# Patient Record
Sex: MALE | Race: WHITE | URBAN - METROPOLITAN AREA
[De-identification: names, ages, dates, MRNs, and addresses within clinical notes are randomized per-mention and may not be internally consistent; named-entity substitution may affect disease eponyms.]

---

## 2018-12-26 ENCOUNTER — TELEPHONE (OUTPATIENT)
Dept: INTERNAL MEDICINE CLINIC | Age: 53
End: 2018-12-26

## 2021-07-26 ENCOUNTER — OFFICE VISIT (OUTPATIENT)
Dept: INTERNAL MEDICINE CLINIC | Age: 56
End: 2021-07-26
Payer: COMMERCIAL

## 2021-07-26 VITALS
OXYGEN SATURATION: 99 % | WEIGHT: 248.4 LBS | SYSTOLIC BLOOD PRESSURE: 138 MMHG | DIASTOLIC BLOOD PRESSURE: 80 MMHG | HEART RATE: 80 BPM

## 2021-07-26 DIAGNOSIS — Z12.5 PROSTATE CANCER SCREENING: ICD-10-CM

## 2021-07-26 DIAGNOSIS — Z00.00 WELL ADULT EXAM: Primary | ICD-10-CM

## 2021-07-26 DIAGNOSIS — Z86.010 HISTORY OF COLON POLYPS: ICD-10-CM

## 2021-07-26 PROCEDURE — 99386 PREV VISIT NEW AGE 40-64: CPT | Performed by: INTERNAL MEDICINE

## 2021-07-26 ASSESSMENT — ENCOUNTER SYMPTOMS
SHORTNESS OF BREATH: 0
DIARRHEA: 0
CHEST TIGHTNESS: 0
CONSTIPATION: 0

## 2021-07-26 ASSESSMENT — PATIENT HEALTH QUESTIONNAIRE - PHQ9
1. LITTLE INTEREST OR PLEASURE IN DOING THINGS: 0
SUM OF ALL RESPONSES TO PHQ9 QUESTIONS 1 & 2: 0
SUM OF ALL RESPONSES TO PHQ QUESTIONS 1-9: 0
SUM OF ALL RESPONSES TO PHQ QUESTIONS 1-9: 0
2. FEELING DOWN, DEPRESSED OR HOPELESS: 0
SUM OF ALL RESPONSES TO PHQ QUESTIONS 1-9: 0

## 2021-07-26 NOTE — PATIENT INSTRUCTIONS
Please check you blood pressure 2-3 times per week at different times of the day. Please bring the readings and your blood pressure cuff with you to your next appointment. Goal blood pressure is under 135/85, ideal is in the 120's/80's and below. Mix equal parts hydrogen peroxide and warm water and put a few drops in your ears 2-3 days per week to prevent wax build up. Patient Education        DASH Diet: Care Instructions  Your Care Instructions     The DASH diet is an eating plan that can help lower your blood pressure. DASH stands for Dietary Approaches to Stop Hypertension. Hypertension is high blood pressure. The DASH diet focuses on eating foods that are high in calcium, potassium, and magnesium. These nutrients can lower blood pressure. The foods that are highest in these nutrients are fruits, vegetables, low-fat dairy products, nuts, seeds, and legumes. But taking calcium, potassium, and magnesium supplements instead of eating foods that are high in those nutrients does not have the same effect. The DASH diet also includes whole grains, fish, and poultry. The DASH diet is one of several lifestyle changes your doctor may recommend to lower your high blood pressure. Your doctor may also want you to decrease the amount of sodium in your diet. Lowering sodium while following the DASH diet can lower blood pressure even further than just the DASH diet alone. Follow-up care is a key part of your treatment and safety. Be sure to make and go to all appointments, and call your doctor if you are having problems. It's also a good idea to know your test results and keep a list of the medicines you take. How can you care for yourself at home? Following the DASH diet  · Eat 4 to 5 servings of fruit each day. A serving is 1 medium-sized piece of fruit, ½ cup chopped or canned fruit, 1/4 cup dried fruit, or 4 ounces (½ cup) of fruit juice. Choose fruit more often than fruit juice.   · Eat 4 to 5 servings of vegetables each day. A serving is 1 cup of lettuce or raw leafy vegetables, ½ cup of chopped or cooked vegetables, or 4 ounces (½ cup) of vegetable juice. Choose vegetables more often than vegetable juice. · Get 2 to 3 servings of low-fat and fat-free dairy each day. A serving is 8 ounces of milk, 1 cup of yogurt, or 1 ½ ounces of cheese. · Eat 6 to 8 servings of grains each day. A serving is 1 slice of bread, 1 ounce of dry cereal, or ½ cup of cooked rice, pasta, or cooked cereal. Try to choose whole-grain products as much as possible. · Limit lean meat, poultry, and fish to 2 servings each day. A serving is 3 ounces, about the size of a deck of cards. · Eat 4 to 5 servings of nuts, seeds, and legumes (cooked dried beans, lentils, and split peas) each week. A serving is 1/3 cup of nuts, 2 tablespoons of seeds, or ½ cup of cooked beans or peas. · Limit fats and oils to 2 to 3 servings each day. A serving is 1 teaspoon of vegetable oil or 2 tablespoons of salad dressing. · Limit sweets and added sugars to 5 servings or less a week. A serving is 1 tablespoon jelly or jam, ½ cup sorbet, or 1 cup of lemonade. · Eat less than 2,300 milligrams (mg) of sodium a day. If you limit your sodium to 1,500 mg a day, you can lower your blood pressure even more. · Be aware that all of these are the suggested number of servings for people who eat 1,800 to 2,000 calories a day. Your recommended number of servings may be different if you need more or fewer calories. Tips for success  · Start small. Do not try to make dramatic changes to your diet all at once. You might feel that you are missing out on your favorite foods and then be more likely to not follow the plan. Make small changes, and stick with them. Once those changes become habit, add a few more changes. · Try some of the following:  ? Make it a goal to eat a fruit or vegetable at every meal and at snacks.  This will make it easy to get the recommended amount of fruits and vegetables each day. ? Try yogurt topped with fruit and nuts for a snack or healthy dessert. ? Add lettuce, tomato, cucumber, and onion to sandwiches. ? Combine a ready-made pizza crust with low-fat mozzarella cheese and lots of vegetable toppings. Try using tomatoes, squash, spinach, broccoli, carrots, cauliflower, and onions. ? Have a variety of cut-up vegetables with a low-fat dip as an appetizer instead of chips and dip. ? Sprinkle sunflower seeds or chopped almonds over salads. Or try adding chopped walnuts or almonds to cooked vegetables. ? Try some vegetarian meals using beans and peas. Add garbanzo or kidney beans to salads. Make burritos and tacos with mashed [place] beans or black beans. Where can you learn more? Go to https://[employer]. org and sign in to your [employer] account. Enter F321 in the [employer] box to learn more about \"DASH Diet: Care Instructions. \"     If you do not have an account, please click on the \"Sign Up Now\" link. Current as of: August 31, 2020               Content Version: 12.9  © 2451-5618 [employer]. Care instructions adapted under license by [employer]. If you have questions about a medical condition or this instruction, always ask your healthcare professional. [place] [address] any warranty or liability for your use of this information. Patient Education        High Blood Pressure: Care Instructions  Overview     It's normal for blood pressure to go up and down throughout the day. But if it stays up, you have high blood pressure. Another name for high blood pressure is hypertension. Despite what a lot of people think, high blood pressure usually doesn't cause headaches or make you feel dizzy or lightheaded. It usually has no symptoms. But it does increase your risk of stroke, heart attack, and other problems.  You and your doctor will talk about your risks of these problems based on your blood pressure. Your doctor will give you a goal for your blood pressure. Your goal will be based on your health and your age. Lifestyle changes, such as eating healthy and being active, are always important to help lower blood pressure. You might also take medicine to reach your blood pressure goal.  Follow-up care is a key part of your treatment and safety. Be sure to make and go to all appointments, and call your doctor if you are having problems. It's also a good idea to know your test results and keep a list of the medicines you take. How can you care for yourself at home? Medical treatment  · If you stop taking your medicine, your blood pressure will go back up. You may take one or more types of medicine to lower your blood pressure. Be safe with medicines. Take your medicine exactly as prescribed. Call your doctor if you think you are having a problem with your medicine. · Talk to your doctor before you start taking aspirin every day. Aspirin can help certain people lower their risk of a heart attack or stroke. But taking aspirin isn't right for everyone, because it can cause serious bleeding. · See your doctor regularly. You may need to see the doctor more often at first or until your blood pressure comes down. · If you are taking blood pressure medicine, talk to your doctor before you take decongestants or anti-inflammatory medicine, such as ibuprofen. Some of these medicines can raise blood pressure. · Learn how to check your blood pressure at home. Lifestyle changes  · Stay at a healthy weight. This is especially important if you put on weight around the waist. Losing even 10 pounds can help you lower your blood pressure. · If your doctor recommends it, get more exercise. Walking is a good choice. Bit by bit, increase the amount you walk every day. Try for at least 30 minutes on most days of the week. You also may want to swim, bike, or do other activities. · Avoid or limit alcohol.  Talk to your doctor about whether you can drink any alcohol. · Try to limit how much sodium you eat to less than 2,300 milligrams (mg) a day. Your doctor may ask you to try to eat less than 1,500 mg a day. · Eat plenty of fruits (such as bananas and oranges), vegetables, legumes, whole grains, and low-fat dairy products. · Lower the amount of saturated fat in your diet. Saturated fat is found in animal products such as milk, cheese, and meat. Limiting these foods may help you lose weight and also lower your risk for heart disease. · Do not smoke. Smoking increases your risk for heart attack and stroke. If you need help quitting, talk to your doctor about stop-smoking programs and medicines. These can increase your chances of quitting for good. When should you call for help? Call 911  anytime you think you may need emergency care. This may mean having symptoms that suggest that your blood pressure is causing a serious heart or blood vessel problem. Your blood pressure may be over 180/120. For example, call 911 if:    · You have symptoms of a heart attack. These may include:  ? Chest pain or pressure, or a strange feeling in the chest.  ? Sweating. ? Shortness of breath. ? Nausea or vomiting. ? Pain, pressure, or a strange feeling in the back, neck, jaw, or upper belly or in one or both shoulders or arms. ? Lightheadedness or sudden weakness. ? A fast or irregular heartbeat.     · You have symptoms of a stroke. These may include:  ? Sudden numbness, tingling, weakness, or loss of movement in your face, arm, or leg, especially on only one side of your body. ? Sudden vision changes. ? Sudden trouble speaking. ? Sudden confusion or trouble understanding simple statements. ? Sudden problems with walking or balance. ? A sudden, severe headache that is different from past headaches.     · You have severe back or belly pain. Do not wait until your blood pressure comes down on its own. Get help right away.   Call blood.  Your doctor will give you a goal for your blood pressure based on your health and your age. High blood pressure (hypertension) means that the top number stays high, or the bottom number stays high, or both. High blood pressure increases the risk of stroke, heart attack, and other problems. What happens when you have high blood pressure? · Blood flows through your arteries with too much force. Over time, this can damage the heart and the walls of your arteries. But you can't feel it. High blood pressure usually doesn't cause symptoms. · High blood pressure makes your heart work harder. And that can lead to heart failure, which means your heart doesn't pump as much blood as your body needs. · Fat and calcium start to build up in your arteries. This buildup is called hardening of the arteries. It can cause many problems including a heart attack and stroke. · Arteries also carry blood and oxygen to organs like your eyes, kidneys, and brain. If high blood pressure damages those arteries, it can lead to vision loss, kidney disease, stroke, and a higher risk of dementia. How can you prevent high blood pressure? · Stay at a healthy weight. · Try to limit how much sodium you eat to less than 2,300 milligrams (mg) a day. If you limit your sodium to 1,500 mg a day, you can lower your blood pressure even more. ? Buy foods that are labeled \"unsalted,\" \"sodium-free,\" or \"low-sodium. \" Foods labeled \"reduced-sodium\" and \"light sodium\" may still have too much sodium. ? Flavor your food with garlic, lemon juice, onion, vinegar, herbs, and spices instead of salt. Do not use soy sauce, steak sauce, onion salt, garlic salt, mustard, or ketchup on your food. ? Use less salt (or none) when recipes call for it. You can often use half the salt a recipe calls for without losing flavor. · Be physically active. Get at least 30 minutes of exercise on most days of the week. Walking is a good choice.  You also may want to do other activities, such as running, swimming, cycling, or playing tennis or team sports. · Limit alcohol to 2 drinks a day for men and 1 drink a day for women. · Eat plenty of fruits, vegetables, and low-fat dairy products. Eat less saturated and total fats. How is high blood pressure treated? · Your doctor will suggest making lifestyle changes to help your heart. For example, your doctor may ask you to eat healthy foods, quit smoking, lose extra weight, and be more active. · If lifestyle changes don't help enough, your doctor may recommend that you take medicine. · When blood pressure is very high, medicines are needed to lower it. Follow-up care is a key part of your treatment and safety. Be sure to make and go to all appointments, and call your doctor if you are having problems. It's also a good idea to know your test results and keep a list of the medicines you take. Where can you learn more? Go to https://[employer]. org and sign in to your [employer] account. Enter P501 in the [employer] box to learn more about \"Learning About High Blood Pressure. \"     If you do not have an account, please click on the \"Sign Up Now\" link. Current as of: August 31, 2020               Content Version: 12.9  © 3144-7204 [employer]. Care instructions adapted under license by [facility] ([facility]). If you have questions about a medical condition or this instruction, always ask your healthcare professional. [place] [address] any warranty or liability for your use of this information.

## 2021-07-26 NOTE — PROGRESS NOTES
Patient: Alexandria Montemayor is a 49 y.o. male who presents today with the following Chief Complaint(s):  Chief Complaint   Patient presents with    New Patient       HPI     Here today to establish. Is worried about his blood pressure. Has family h/o HTN. BP usually runs 126//90. Does not feel bad with his blood pressure. Does not much alcohol (maybe 3 drinks per week), stopped drinking coffee 1 month ago but does drink 1 bottle of iced tea per day. Has an active job- works as a  for [employer], walks 2.5 miles 2-3 days per week. Does notice that he sweats more with activity than usual.     Ears will get clogged with wax. Colonoscopy in 2019. Repeat due 2022 d/t polyps. No Known Allergies   History reviewed. No pertinent past medical history. Past Surgical History:   Procedure Laterality Date    WISDOM TOOTH EXTRACTION        Social History     Socioeconomic History    Marital status:      Spouse name: Not on file    Number of children: Not on file    Years of education: Not on file    Highest education level: Not on file   Occupational History    Not on file   Tobacco Use    Smoking status: Never Smoker    Smokeless tobacco: Never Used   Vaping Use    Vaping Use: Never used   Substance and Sexual Activity    Alcohol use: Yes     Comment: 3 drinks per week at the most    Drug use: Never    Sexual activity: Not on file   Other Topics Concern    Not on file   Social History Narrative    Not on file     Social Determinants of Health     Financial Resource Strain:     Difficulty of Paying Living Expenses:    Food Insecurity:     Worried About Running Out of Food in the Last Year:     [address] in the Last Year:    Transportation Needs:     Lack of Transportation (Medical):      Lack of Transportation (Non-Medical):    Physical Activity:     Days of Exercise per Week:     Minutes of Exercise per Session:    Stress:     Feeling of Stress :    Social Conjunctiva/sclera: Conjunctivae normal.      Pupils: Pupils are equal, round, and reactive to light. Neck:      Thyroid: No thyroid mass or thyromegaly. Vascular: No carotid bruit. Cardiovascular:      Rate and Rhythm: Normal rate and regular rhythm. Pulses:           Dorsalis pedis pulses are 2+ on the right side and 2+ on the left side. Heart sounds: Normal heart sounds. No murmur heard. Comments: No LE edema  Pulmonary:      Effort: Pulmonary effort is normal.      Breath sounds: Normal breath sounds. Lymphadenopathy:      Cervical: No cervical adenopathy. Neurological:      General: No focal deficit present. Mental Status: He is alert and oriented to person, place, and time. Psychiatric:         Mood and Affect: Mood normal.         Behavior: Behavior normal. Behavior is cooperative. ASSESSMENT/PLAN:    Problem List Items Addressed This Visit     History of colon polyps     Patient reports colon polyps on colonoscopy in 2019. Due for repeat colonoscopy in 2022. Well adult exam - Primary      Check fasting blood work. Continue with activity level. Up-to-date on colonoscopy but is due for repeat in 2022 due to colon polyps. Up-to-date on COVID-19 vaccination, no need for early pneumonia vaccine. Recommend Shingrix. Up-to-date on tetanus per patient-we will need to get information through urgent care/occupational health faciltiy where this was done. Relevant Orders    CBC Auto Differential    Comprehensive Metabolic Panel    Lipid Panel    Vitamin D 25 Hydroxy    TSH with Reflex    PSA screening      Other Visit Diagnoses     Prostate cancer screening        Relevant Orders    PSA screening          Current Outpatient Medications   Medication Sig Dispense Refill    Glucosamine-Chondroit-Vit C-Mn (GLUCOSAMINE 1500 COMPLEX PO) Take by mouth       No current facility-administered medications for this visit.        Return in about 6 months (around 1/26/2022) for blood pressure.

## 2021-07-31 PROBLEM — Z86.010 HISTORY OF COLON POLYPS: Status: ACTIVE | Noted: 2021-07-31

## 2021-07-31 PROBLEM — Z86.0100 HISTORY OF COLON POLYPS: Status: ACTIVE | Noted: 2021-07-31

## 2021-07-31 PROBLEM — Z00.00 WELL ADULT EXAM: Status: ACTIVE | Noted: 2021-07-31

## 2021-07-31 NOTE — ASSESSMENT & PLAN NOTE
Check fasting blood work. Continue with activity level. Up-to-date on colonoscopy but is due for repeat in 2022 due to colon polyps. Up-to-date on COVID-19 vaccination, no need for early pneumonia vaccine. Recommend Shingrix. Up-to-date on tetanus per patient-we will need to get information through urgent care/occupational health faciltiy where this was done.

## 2021-08-30 PROBLEM — Z00.00 WELL ADULT EXAM: Status: RESOLVED | Noted: 2021-07-31 | Resolved: 2021-08-30

## 2021-12-22 DIAGNOSIS — Z12.5 PROSTATE CANCER SCREENING: ICD-10-CM

## 2021-12-22 DIAGNOSIS — Z00.00 WELL ADULT EXAM: ICD-10-CM

## 2021-12-22 LAB
A/G RATIO: 1.8 (ref 1.1–2.2)
ALBUMIN SERPL-MCNC: 4.6 G/DL (ref 3.4–5)
ALP BLD-CCNC: 72 U/L (ref 40–129)
ALT SERPL-CCNC: 33 U/L (ref 10–40)
ANION GAP SERPL CALCULATED.3IONS-SCNC: 11 MMOL/L (ref 3–16)
AST SERPL-CCNC: 29 U/L (ref 15–37)
BASOPHILS ABSOLUTE: 0 K/UL (ref 0–0.2)
BASOPHILS RELATIVE PERCENT: 0.6 %
BILIRUB SERPL-MCNC: 0.8 MG/DL (ref 0–1)
BUN BLDV-MCNC: 9 MG/DL (ref 7–20)
CALCIUM SERPL-MCNC: 9.4 MG/DL (ref 8.3–10.6)
CHLORIDE BLD-SCNC: 107 MMOL/L (ref 99–110)
CHOLESTEROL, TOTAL: 181 MG/DL (ref 0–199)
CO2: 26 MMOL/L (ref 21–32)
CREAT SERPL-MCNC: 0.9 MG/DL (ref 0.9–1.3)
EOSINOPHILS ABSOLUTE: 0.1 K/UL (ref 0–0.6)
EOSINOPHILS RELATIVE PERCENT: 1.2 %
GFR AFRICAN AMERICAN: >60
GFR NON-AFRICAN AMERICAN: >60
GLUCOSE BLD-MCNC: 106 MG/DL (ref 70–99)
HCT VFR BLD CALC: 45.9 % (ref 40.5–52.5)
HDLC SERPL-MCNC: 56 MG/DL (ref 40–60)
HEMOGLOBIN: 15.3 G/DL (ref 13.5–17.5)
LDL CHOLESTEROL CALCULATED: 112 MG/DL
LYMPHOCYTES ABSOLUTE: 1.5 K/UL (ref 1–5.1)
LYMPHOCYTES RELATIVE PERCENT: 29.7 %
MCH RBC QN AUTO: 30.5 PG (ref 26–34)
MCHC RBC AUTO-ENTMCNC: 33.2 G/DL (ref 31–36)
MCV RBC AUTO: 91.7 FL (ref 80–100)
MONOCYTES ABSOLUTE: 0.3 K/UL (ref 0–1.3)
MONOCYTES RELATIVE PERCENT: 6 %
NEUTROPHILS ABSOLUTE: 3.1 K/UL (ref 1.7–7.7)
NEUTROPHILS RELATIVE PERCENT: 62.5 %
PDW BLD-RTO: 13.3 % (ref 12.4–15.4)
PLATELET # BLD: 250 K/UL (ref 135–450)
PMV BLD AUTO: 7.8 FL (ref 5–10.5)
POTASSIUM SERPL-SCNC: 4 MMOL/L (ref 3.5–5.1)
PROSTATE SPECIFIC ANTIGEN: 0.61 NG/ML (ref 0–4)
RBC # BLD: 5.01 M/UL (ref 4.2–5.9)
SODIUM BLD-SCNC: 144 MMOL/L (ref 136–145)
TOTAL PROTEIN: 7.1 G/DL (ref 6.4–8.2)
TRIGL SERPL-MCNC: 64 MG/DL (ref 0–150)
TSH REFLEX: 2.01 UIU/ML (ref 0.27–4.2)
VITAMIN D 25-HYDROXY: 29.4 NG/ML
VLDLC SERPL CALC-MCNC: 13 MG/DL
WBC # BLD: 5 K/UL (ref 4–11)

## 2022-01-07 ENCOUNTER — HOSPITAL ENCOUNTER (OUTPATIENT)
Dept: GENERAL RADIOLOGY | Age: 57
Discharge: HOME | End: 2022-01-07
Payer: COMMERCIAL

## 2022-01-07 ENCOUNTER — HOSPITAL ENCOUNTER (OUTPATIENT)
Age: 57
Discharge: HOME | End: 2022-01-07
Payer: COMMERCIAL

## 2022-01-07 ENCOUNTER — OFFICE VISIT (OUTPATIENT)
Dept: INTERNAL MEDICINE CLINIC | Age: 57
End: 2022-01-07
Payer: COMMERCIAL

## 2022-01-07 VITALS
SYSTOLIC BLOOD PRESSURE: 138 MMHG | BODY MASS INDEX: 32.2 KG/M2 | TEMPERATURE: 97.6 F | DIASTOLIC BLOOD PRESSURE: 80 MMHG | WEIGHT: 243 LBS | HEART RATE: 76 BPM | OXYGEN SATURATION: 98 % | HEIGHT: 73 IN

## 2022-01-07 DIAGNOSIS — R07.89 CHEST PRESSURE: ICD-10-CM

## 2022-01-07 DIAGNOSIS — R73.9 HYPERGLYCEMIA: ICD-10-CM

## 2022-01-07 DIAGNOSIS — R00.2 PALPITATIONS: ICD-10-CM

## 2022-01-07 DIAGNOSIS — R22.2 SOFT TISSUE SWELLING OF CHEST WALL: ICD-10-CM

## 2022-01-07 DIAGNOSIS — I10 PRIMARY HYPERTENSION: ICD-10-CM

## 2022-01-07 DIAGNOSIS — R29.818 SUSPECTED SLEEP APNEA: Primary | ICD-10-CM

## 2022-01-07 LAB — HBA1C MFR BLD: 5.1 %

## 2022-01-07 PROCEDURE — 99214 OFFICE O/P EST MOD 30 MIN: CPT | Performed by: INTERNAL MEDICINE

## 2022-01-07 PROCEDURE — 83036 HEMOGLOBIN GLYCOSYLATED A1C: CPT | Performed by: INTERNAL MEDICINE

## 2022-01-07 PROCEDURE — 71046 X-RAY EXAM CHEST 2 VIEWS: CPT

## 2022-01-07 PROCEDURE — 93000 ELECTROCARDIOGRAM COMPLETE: CPT | Performed by: INTERNAL MEDICINE

## 2022-01-07 RX ORDER — NEBIVOLOL 2.5 MG/1
2.5 TABLET ORAL DAILY
Qty: 90 TABLET | Refills: 1 | Status: SHIPPED | OUTPATIENT
Start: 2022-01-07 | End: 2022-01-10 | Stop reason: SDUPTHER

## 2022-01-07 SDOH — ECONOMIC STABILITY: FOOD INSECURITY: WITHIN THE PAST 12 MONTHS, THE FOOD YOU BOUGHT JUST DIDN'T LAST AND YOU DIDN'T HAVE MONEY TO GET MORE.: NEVER TRUE

## 2022-01-07 SDOH — ECONOMIC STABILITY: FOOD INSECURITY: WITHIN THE PAST 12 MONTHS, YOU WORRIED THAT YOUR FOOD WOULD RUN OUT BEFORE YOU GOT MONEY TO BUY MORE.: NEVER TRUE

## 2022-01-07 ASSESSMENT — SOCIAL DETERMINANTS OF HEALTH (SDOH): HOW HARD IS IT FOR YOU TO PAY FOR THE VERY BASICS LIKE FOOD, HOUSING, MEDICAL CARE, AND HEATING?: NOT HARD AT ALL

## 2022-01-07 ASSESSMENT — PATIENT HEALTH QUESTIONNAIRE - PHQ9
SUM OF ALL RESPONSES TO PHQ9 QUESTIONS 1 & 2: 0
2. FEELING DOWN, DEPRESSED OR HOPELESS: 0
SUM OF ALL RESPONSES TO PHQ QUESTIONS 1-9: 0
1. LITTLE INTEREST OR PLEASURE IN DOING THINGS: 0
SUM OF ALL RESPONSES TO PHQ QUESTIONS 1-9: 0

## 2022-01-07 NOTE — PROGRESS NOTES
Patient: Yovany Tolentino is a 49 y.o. male who presents today with the following Chief Complaint(s):  Chief Complaint   Patient presents with    Follow-up     6 months    Other     chest pressure for the past week         HPI     Here today for follow up. Has been watching his blood pressure at home over the past few weeks- was in the upper 140's/90's. Had an episode last week where he felt like his heart was pounding really hard- woke him up from sleep. Happened 5 times that night. Cut out caffeine (was only drinking 1 cup of coffee per day), iced tea, chocolate. Happened the next night as well. Did take about 3-4 days to subside. Will have some episodes of \"beating hard\" and will need to catch his breath. Has had some HA as well. Does not sleep at night-sleep is restless. Does snore. Does not have chest symptoms with activity. Feels better when he is more active. Did tear out and replace the insulation attic about 2 weeks ago. Has a bump on his chest- gets bigger at times and smaller at times. Feels well but doesn't feel \"right\". Was eating peanut brittle and other treats around time that had labs drawn. POCT HbA1c 5.1%    Results for orders placed or performed in visit on 01/07/22   POCT glycosylated hemoglobin (Hb A1C)   Result Value Ref Range    Hemoglobin A1C 5.1 %      The 10-year ASCVD risk score (Sepideh Hassan, et al., 2013) is: 5.4%    Values used to calculate the score:      Age: 49 years      Sex: Male      Is Non- : No      Diabetic: No      Tobacco smoker: No      Systolic Blood Pressure: 303 mmHg      Is BP treated: Yes      HDL Cholesterol: 56 mg/dL      Total Cholesterol: 181 mg/dL      No Known Allergies   No past medical history on file.    Past Surgical History:   Procedure Laterality Date    WISDOM TOOTH EXTRACTION        Social History     Socioeconomic History    Marital status:      Spouse name: Not on file    Number of children: Not on file    Years of education: Not on file    Highest education level: Not on file   Occupational History    Not on file   Tobacco Use    Smoking status: Never Smoker    Smokeless tobacco: Never Used   Vaping Use    Vaping Use: Never used   Substance and Sexual Activity    Alcohol use: Yes     Comment: 3 drinks per week at the most    Drug use: Never    Sexual activity: Not on file   Other Topics Concern    Not on file   Social History Narrative    Not on file     Social Determinants of Health     Financial Resource Strain: Low Risk     Difficulty of Paying Living Expenses: Not hard at all   Food Insecurity: No Food Insecurity    Worried About [facility] in the Last Year: Never true    [facility] in the Last Year: Never true   Transportation Needs:     Lack of Transportation (Medical): Not on file    Lack of Transportation (Non-Medical):  Not on file   Physical Activity:     Days of Exercise per Week: Not on file    Minutes of Exercise per Session: Not on file   Stress:     Feeling of Stress : Not on file   Social Connections:     Frequency of Communication with Friends and Family: Not on file    Frequency of Social Gatherings with Friends and Family: Not on file    Attends Gnosticist Services: Not on file    Active Member of [address] [place] [address] [employer] or Organizations: Not on file    Attends Club or Organization Meetings: Not on file    Marital Status: Not on file   Intimate Partner Violence:     Fear of Current or Ex-Partner: Not on file    Emotionally Abused: Not on file    Physically Abused: Not on file    Sexually Abused: Not on file   Housing Stability:     Unable to Pay for Housing in the Last Year: Not on file    Number of [place] in the Last Year: Not on file    Unstable Housing in the Last Year: Not on file     Family History   Problem Relation Age of Onset    High Blood Pressure Mother     Cancer Mother 76        breast cancer    Emphysema Father     Asthma Father     High Blood Pressure Sister     High Blood Pressure Sister     High Blood Pressure Brother     No Known Problems Son     No Known Problems Son         Outpatient Medications Prior to Visit   Medication Sig Dispense Refill    Glucosamine-Chondroit-Vit C-Mn (GLUCOSAMINE 1500 COMPLEX PO) Take by mouth       No facility-administered medications prior to visit. Patient'spast medical history, surgical history, family history, medications,  and allergies  were all reviewed and updated as appropriate today. Review of Systems   Constitutional: Negative for appetite change, fatigue and fever. Respiratory: Negative for chest tightness and shortness of breath. Cardiovascular: Positive for palpitations. Negative for chest pain. Gastrointestinal: Negative for constipation and diarrhea. Skin: Negative for rash. /80   Pulse 76   Temp 97.6 °F (36.4 °C)   Ht 6' 1\" (1.854 m)   Wt 243 lb (110.2 kg)   SpO2 98%   BMI 32.06 kg/m²   Physical Exam  Vitals and nursing note reviewed. Constitutional:       Appearance: He is well-developed. He is not toxic-appearing. HENT:      Head: Normocephalic. Right Ear: Tympanic membrane, ear canal and external ear normal.      Left Ear: Tympanic membrane, ear canal and external ear normal.      Mouth/Throat:      Pharynx: No oropharyngeal exudate or posterior oropharyngeal erythema. Eyes:      General: No scleral icterus. Extraocular Movements: Extraocular movements intact. Conjunctiva/sclera: Conjunctivae normal.      Pupils: Pupils are equal, round, and reactive to light. Neck:      Thyroid: No thyroid mass or thyromegaly. Vascular: No carotid bruit. Cardiovascular:      Rate and Rhythm: Normal rate and regular rhythm. Heart sounds: Normal heart sounds. No murmur heard. Pulmonary:      Effort: Pulmonary effort is normal.      Breath sounds: Normal breath sounds.    Chest:      Chest wall: No mass, lacerations, deformity, swelling, tenderness, crepitus or edema. Breasts: Breasts are symmetrical.       Musculoskeletal:      Right lower leg: No edema. Left lower leg: No edema. Lymphadenopathy:      Cervical: No cervical adenopathy. Neurological:      General: No focal deficit present. Mental Status: He is alert and oriented to person, place, and time. Psychiatric:         Mood and Affect: Mood normal.         Behavior: Behavior normal. Behavior is cooperative. ASSESSMENT/PLAN:    Problem List Items Addressed This Visit     Suspected sleep apnea - Primary     Refer to sleep medicine. Relevant Orders    Lashay Eli MD, Sleep Medicine, [facility]    Soft tissue swelling of chest wall      Likely benign. Check chest x-ray. Area in question is the xiphoid process which is normal on exam.         Relevant Orders    XR CHEST STANDARD (2 VW) (Completed)    Primary hypertension      Start Bystolic 2.5 mg nightly. Discussed lifestyle modifications to help with hypertension. Discussed concern for possible underlying sleep apnea which may be contributing to his hypertension. Palpitations     EKG was unremarkable. May need an event monitor. Start Bystolic 2.5 mg nightly for hypertension which should also help with palpitations. Refer for sleep study. Hyperglycemia      PCO T hemoglobin A1c was 5.1%. Discussed diet and weight loss. Relevant Orders    POCT glycosylated hemoglobin (Hb A1C) (Completed)    Chest pressure      Nonexertional.  Possibly related to hypertension. Possibly musculoskeletal in nature given timing following cleaning the attic and replacing insulation. EKG in the office was unremarkable.          Relevant Orders    EKG 12 lead (Completed)          Current Outpatient Medications   Medication Sig Dispense Refill    nebivolol (BYSTOLIC) 2.5 MG tablet Take 1 tablet by mouth daily 90 tablet 1    Glucosamine-Chondroit-Vit C-Mn (GLUCOSAMINE 1500 COMPLEX PO) Take by mouth       No current facility-administered medications for this visit. Return in about 6 weeks (around 2/18/2022) for BP.

## 2022-01-08 PROBLEM — R22.2 SOFT TISSUE SWELLING OF CHEST WALL: Status: ACTIVE | Noted: 2022-01-08

## 2022-01-08 PROBLEM — R73.9 HYPERGLYCEMIA: Status: ACTIVE | Noted: 2022-01-08

## 2022-01-08 ASSESSMENT — ENCOUNTER SYMPTOMS
DIARRHEA: 0
SHORTNESS OF BREATH: 0
CONSTIPATION: 0
CHEST TIGHTNESS: 0

## 2022-01-09 NOTE — ASSESSMENT & PLAN NOTE
EKG was unremarkable. May need an event monitor. Start Bystolic 2.5 mg nightly for hypertension which should also help with palpitations. Refer for sleep study.

## 2022-01-09 NOTE — ASSESSMENT & PLAN NOTE
Start Bystolic 2.5 mg nightly. Discussed lifestyle modifications to help with hypertension. Discussed concern for possible underlying sleep apnea which may be contributing to his hypertension.

## 2022-01-09 NOTE — ASSESSMENT & PLAN NOTE
Likely benign. Check chest x-ray.   Area in question is the xiphoid process which is normal on exam.

## 2022-01-10 ENCOUNTER — TELEPHONE (OUTPATIENT)
Dept: INTERNAL MEDICINE CLINIC | Age: 57
End: 2022-01-10

## 2022-01-10 RX ORDER — NEBIVOLOL 2.5 MG/1
2.5 TABLET ORAL DAILY
Qty: 30 TABLET | Refills: 0 | Status: SHIPPED | OUTPATIENT
Start: 2022-01-10 | End: 2022-02-09

## 2022-01-10 RX ORDER — NEBIVOLOL 2.5 MG/1
2.5 TABLET ORAL DAILY
Qty: 90 TABLET | Refills: 3 | Status: SHIPPED | OUTPATIENT
Start: 2022-01-10 | End: 2022-03-09 | Stop reason: SDUPTHER

## 2022-01-10 NOTE — TELEPHONE ENCOUNTER
Spoke with [employer] and pt's insurance will only cover 30 day supply. They want pt to use [facility]. Pt was notified.

## 2022-01-10 NOTE — TELEPHONE ENCOUNTER
Pt calling about nebivolol (BYSTOLIC) 2.5 MG tablet. States he called pharmacy and they only have a 30 day supply, they did not have it in stock but should be getting it tomorrow. Pt aware 90 days was sent over, unsure why pharmacy is only going to give him 30 day supply. Please advise.

## 2022-02-09 ENCOUNTER — OFFICE VISIT (OUTPATIENT)
Dept: INTERNAL MEDICINE CLINIC | Age: 57
End: 2022-02-09
Payer: COMMERCIAL

## 2022-02-09 VITALS
HEART RATE: 64 BPM | SYSTOLIC BLOOD PRESSURE: 130 MMHG | WEIGHT: 239.2 LBS | OXYGEN SATURATION: 98 % | BODY MASS INDEX: 31.56 KG/M2 | DIASTOLIC BLOOD PRESSURE: 84 MMHG

## 2022-02-09 DIAGNOSIS — Z00.00 WELL ADULT EXAM: ICD-10-CM

## 2022-02-09 DIAGNOSIS — I10 PRIMARY HYPERTENSION: Primary | ICD-10-CM

## 2022-02-09 DIAGNOSIS — R00.2 PALPITATIONS: ICD-10-CM

## 2022-02-09 DIAGNOSIS — R73.9 HYPERGLYCEMIA: ICD-10-CM

## 2022-02-09 DIAGNOSIS — Z12.5 PROSTATE CANCER SCREENING: ICD-10-CM

## 2022-02-09 DIAGNOSIS — R07.89 CHEST PRESSURE: ICD-10-CM

## 2022-02-09 DIAGNOSIS — R29.818 SUSPECTED SLEEP APNEA: ICD-10-CM

## 2022-02-09 PROCEDURE — 99214 OFFICE O/P EST MOD 30 MIN: CPT | Performed by: INTERNAL MEDICINE

## 2022-02-09 RX ORDER — OMEPRAZOLE 40 MG/1
40 CAPSULE, DELAYED RELEASE ORAL
Qty: 90 CAPSULE | Refills: 2 | Status: SHIPPED | OUTPATIENT
Start: 2022-02-09 | End: 2022-08-09 | Stop reason: SDUPTHER

## 2022-02-09 NOTE — PROGRESS NOTES
Patient: Virginia Peralta is a 49 y.o. male who presents today with the following Chief Complaint(s):  Chief Complaint   Patient presents with    Check-Up       HPI     Here today for follow up on blood pressure. Father  last week,  is tomorrow. Cat was recently found to have cancer. Chest pains seem to be improving. Feels like food is stuck in his throat. Worse in the past week since his father . Is scheduled for sleep appointment with Dr. Femi Guerrero in March. CXR was normal in January. Palpitations have improved with Bystolic. Does notice palpitations more when quiet and mind is able to wander. Has also cut out caffeine. Has been trying to eat better as well. BP at home running from 115/70's-130/89. HR has been around 55. Wt Readings from Last 3 Encounters:   22 239 lb 3.2 oz (108.5 kg)   22 243 lb (110.2 kg)   21 248 lb 6.4 oz (112.7 kg)     Temp Readings from Last 3 Encounters:   22 97.6 °F (36.4 °C)     BP Readings from Last 3 Encounters:   22 130/84   22 138/80   21 138/80     Pulse Readings from Last 3 Encounters:   22 64   22 76   21 80         No Known Allergies   No past medical history on file.    Past Surgical History:   Procedure Laterality Date    WISDOM TOOTH EXTRACTION        Social History     Socioeconomic History    Marital status:      Spouse name: Not on file    Number of children: Not on file    Years of education: Not on file    Highest education level: Not on file   Occupational History    Not on file   Tobacco Use    Smoking status: Never Smoker    Smokeless tobacco: Never Used   Vaping Use    Vaping Use: Never used   Substance and Sexual Activity    Alcohol use: Yes     Comment: 3 drinks per week at the most    Drug use: Never    Sexual activity: Not on file   Other Topics Concern    Not on file   Social History Narrative    Not on file     Social Determinants of Health Financial Resource Strain: Low Risk     Difficulty of Paying Living Expenses: Not hard at all   Food Insecurity: No Food Insecurity    Worried About Running Out of Food in the Last Year: Never true    Apolinar of Food in the Last Year: Never true   Transportation Needs:     Lack of Transportation (Medical): Not on file    Lack of Transportation (Non-Medical):  Not on file   Physical Activity:     Days of Exercise per Week: Not on file    Minutes of Exercise per Session: Not on file   Stress:     Feeling of Stress : Not on file   Social Connections:     Frequency of Communication with Friends and Family: Not on file    Frequency of Social Gatherings with Friends and Family: Not on file    Attends [employer] Services: Not on file    Active Member of [address] [place] [address] [employer] or Organizations: Not on file    Attends Club or Organization Meetings: Not on file    Marital Status: Not on file   Intimate Partner Violence:     Fear of Current or Ex-Partner: Not on file    Emotionally Abused: Not on file    Physically Abused: Not on file    Sexually Abused: Not on file   Housing Stability:     Unable to Pay for Housing in the Last Year: Not on file    Number of [place] in the Last Year: Not on file    Unstable Housing in the Last Year: Not on file     Family History   Problem Relation Age of Onset    High Blood Pressure Mother     Cancer Mother 76        breast cancer    Emphysema Father     Asthma Father     High Blood Pressure Sister     High Blood Pressure Sister     High Blood Pressure Brother     No Known Problems Son     No Known Problems Son         Outpatient Medications Prior to Visit   Medication Sig Dispense Refill    nebivolol (BYSTOLIC) 2.5 MG tablet Take 1 tablet by mouth daily 90 tablet 3    Glucosamine-Chondroit-Vit C-Mn (GLUCOSAMINE 1500 COMPLEX PO) Take by mouth      nebivolol (BYSTOLIC) 2.5 MG tablet Take 1 tablet by mouth daily 30 tablet 0     No facility-administered medications prior to visit. Patient'spast medical history, surgical history, family history, medications,  and allergies  were all reviewed and updated as appropriate today. Review of Systems   Constitutional: Negative for appetite change, fatigue and fever. Respiratory: Negative for chest tightness and shortness of breath. Cardiovascular: Negative for chest pain. Gastrointestinal: Negative for constipation and diarrhea. Skin: Negative for rash. /84   Pulse 64   Wt 239 lb 3.2 oz (108.5 kg)   SpO2 98%   BMI 31.56 kg/m²   Physical Exam  Vitals and nursing note reviewed. Constitutional:       Appearance: He is well-developed. He is not toxic-appearing. HENT:      Head: Normocephalic. Right Ear: Tympanic membrane, ear canal and external ear normal.      Left Ear: Tympanic membrane, ear canal and external ear normal.      Mouth/Throat:      Pharynx: No oropharyngeal exudate or posterior oropharyngeal erythema. Eyes:      General: No scleral icterus. Extraocular Movements: Extraocular movements intact. Conjunctiva/sclera: Conjunctivae normal.      Pupils: Pupils are equal, round, and reactive to light. Neck:      Thyroid: No thyroid mass or thyromegaly. Vascular: No carotid bruit. Cardiovascular:      Rate and Rhythm: Normal rate and regular rhythm. Heart sounds: Normal heart sounds. No murmur heard. Pulmonary:      Effort: Pulmonary effort is normal.      Breath sounds: Normal breath sounds. Abdominal:      Palpations: Abdomen is soft. Tenderness: There is no abdominal tenderness. Musculoskeletal:      Right lower leg: No edema. Left lower leg: No edema. Lymphadenopathy:      Cervical: No cervical adenopathy. Neurological:      General: No focal deficit present. Mental Status: He is alert and oriented to person, place, and time. Psychiatric:         Mood and Affect: Mood normal.         Behavior: Behavior normal. Behavior is cooperative. ASSESSMENT/PLAN:    Problem List Items Addressed This Visit     Chest pressure      Sounds to be related to GERD and stress. Chest pains were worse around the time his father was dying. Trial of omeprazole 40 mg daily. Hyperglycemia      Check hemoglobin A1c. Relevant Orders    Hemoglobin A1C    Palpitations     Palpitations have resolved since starting Bystolic. Continue Bystolic. Primary hypertension - Primary     Better. Continue Bystolic 2.5 mg nightly. Relevant Orders    CBC Auto Differential    Comprehensive Metabolic Panel    Lipid Panel    Suspected sleep apnea      Has appointment scheduled to see Dr. Laya Kim in March. Other Visit Diagnoses     Well adult exam        Relevant Orders    CBC Auto Differential    Comprehensive Metabolic Panel    Hemoglobin A1C    Lipid Panel    PSA screening    TSH with Reflex    Prostate cancer screening        Relevant Orders    PSA screening          Current Outpatient Medications   Medication Sig Dispense Refill    omeprazole (PRILOSEC) 40 MG delayed release capsule Take 1 capsule by mouth every morning (before breakfast) 90 capsule 2    nebivolol (BYSTOLIC) 2.5 MG tablet Take 1 tablet by mouth daily 90 tablet 3    Glucosamine-Chondroit-Vit C-Mn (GLUCOSAMINE 1500 COMPLEX PO) Take by mouth       No current facility-administered medications for this visit. Return in about 6 months (around 8/9/2022) for wellness w/labs ptr.

## 2022-02-09 NOTE — PATIENT INSTRUCTIONS
Please have your labs drawn about 1 week prior to your next appointment August . You may get your labs drawn in our office, Monday-Friday from 7:30 am-4:00 pm. You do not need an appointment. If this does not work for you, you may also have your labs drawn at [facility] earlier during the week or on weekends. If you prefer to have your labs draw elsewhere ([facility]), please allow a little more time for me to get your results and please call the office ahead of your appointment so that we may make sure that we have your labs at the time of your appointment. We sometimes need to call the lab directly (when not done at a  [facility] lab) to get your results. Your labs are fasting.      fasting (nothing to eat w/in 8 hours of the lab test, is ok to drink water or black coffee)

## 2022-02-20 ASSESSMENT — ENCOUNTER SYMPTOMS
SHORTNESS OF BREATH: 0
CHEST TIGHTNESS: 0
DIARRHEA: 0
CONSTIPATION: 0

## 2022-03-08 ENCOUNTER — TELEPHONE (OUTPATIENT)
Dept: INTERNAL MEDICINE CLINIC | Age: 57
End: 2022-03-08

## 2022-03-08 NOTE — TELEPHONE ENCOUNTER
Patient calling requesting refill of nebivolol (BYSTOLIC) 2.5 MG tablet. Patient is requesting 90 day supply and has changed pharmacy. Last written 01/10/22  Last OV 02/09/22  Next OV 08/09/22  Last recommended OV 08/09/22     Please send to [facility] [address].

## 2022-03-09 RX ORDER — NEBIVOLOL 2.5 MG/1
2.5 TABLET ORAL DAILY
Qty: 90 TABLET | Refills: 3 | Status: SHIPPED | OUTPATIENT
Start: 2022-03-09 | End: 2022-08-09 | Stop reason: SDUPTHER

## 2022-03-10 ENCOUNTER — TELEMEDICINE (OUTPATIENT)
Dept: PULMONOLOGY | Age: 57
End: 2022-03-10
Payer: COMMERCIAL

## 2022-03-10 DIAGNOSIS — R06.83 SNORING: ICD-10-CM

## 2022-03-10 DIAGNOSIS — I10 PRIMARY HYPERTENSION: Chronic | ICD-10-CM

## 2022-03-10 DIAGNOSIS — G47.10 HYPERSOMNIA: Primary | ICD-10-CM

## 2022-03-10 DIAGNOSIS — E66.9 NON MORBID OBESITY, UNSPECIFIED OBESITY TYPE: Chronic | ICD-10-CM

## 2022-03-10 PROCEDURE — 99204 OFFICE O/P NEW MOD 45 MIN: CPT | Performed by: INTERNAL MEDICINE

## 2022-03-10 ASSESSMENT — ENCOUNTER SYMPTOMS
APNEA: 0
CHOKING: 0
EYE PAIN: 0
VOMITING: 0
RHINORRHEA: 0
SHORTNESS OF BREATH: 0
ABDOMINAL PAIN: 0
ALLERGIC/IMMUNOLOGIC NEGATIVE: 1
ABDOMINAL DISTENTION: 0
NAUSEA: 0
CHEST TIGHTNESS: 0
PHOTOPHOBIA: 0

## 2022-03-10 ASSESSMENT — SLEEP AND FATIGUE QUESTIONNAIRES
HOW LIKELY ARE YOU TO NOD OFF OR FALL ASLEEP WHILE SITTING QUIETLY AFTER LUNCH WITHOUT ALCOHOL: 0
HOW LIKELY ARE YOU TO NOD OFF OR FALL ASLEEP WHILE LYING DOWN TO REST IN THE AFTERNOON WHEN CIRCUMSTANCES PERMIT: 3
HOW LIKELY ARE YOU TO NOD OFF OR FALL ASLEEP IN A CAR, WHILE STOPPED FOR A FEW MINUTES IN TRAFFIC: 0
HOW LIKELY ARE YOU TO NOD OFF OR FALL ASLEEP WHEN YOU ARE A PASSENGER IN A CAR FOR AN HOUR WITHOUT A BREAK: 0
HOW LIKELY ARE YOU TO NOD OFF OR FALL ASLEEP WHILE SITTING AND TALKING TO SOMEONE: 0
HOW LIKELY ARE YOU TO NOD OFF OR FALL ASLEEP WHILE SITTING INACTIVE IN A PUBLIC PLACE: 0
HOW LIKELY ARE YOU TO NOD OFF OR FALL ASLEEP WHILE WATCHING TV: 2
ESS TOTAL SCORE: 5
HOW LIKELY ARE YOU TO NOD OFF OR FALL ASLEEP WHILE SITTING AND READING: 0

## 2022-03-10 NOTE — LETTER
[facility] Sleep Medicine  [place] [address]  Phone: [phone]  Fax: [phone]           Donnie Gomez MD      March 10, 2022     Patient: John Bellamy   MR Number: [id number]   YOB: 1965   Date of Visit: 3/10/2022       Dear Dr. Juan Hill:    Thank you for referring John Bellamy to me for evaluation/treatment. Below are the relevant portions of my assessment and plan of care. Visit Diagnoses and Associated Orders     Hypersomnia   (New Problem)  -  Primary    needs work-up    Home Sleep Study (HST) [74378 Custom]   - Future Order         Snoring   (New Problem)      needs work-up    Home Sleep Study (HST) [98255 Custom]   - Future Order         Primary hypertension   (Stable)           Non morbid obesity, unspecified obesity type   (Not Stable)                 One or more undiagnosed new problem with uncertain prognosis till final diagnosis is made. Differential diagnosis includes but not limited to: MARIBELL, PLMD's, narcolepsy, parasomnias. Reviewed MARIBELL (highest likelihood Dx): pathophysiology, diagnosis, complications and treatment. Instructed him not to drive if drowsy. Continue medications per her PCP and other physicians. Limit caffeine use after 3pm. Standard of care is to do in-lab PSG but insurance is mandating an inferior HST. 1 wk follow up after study to review his results. The chronic medical conditions listed are directly related to the primary diagnosis listed above. The management of the primary diagnosis affects the secondary diagnosis and vice versa. This information was analyzed to assess complexity and medical decision making in regards to further testing and management. Continue meds for: HTN. Pt would medically benefit from wt loss for MARIBELL (diet, exercise, surgical). Orders Placed This Encounter   Procedures    Home Sleep Study (HST)       If you have questions, please do not hesitate to call me.  I look forward to following Paula Hi along with you.     Sincerely,        Mariangel Gates MD    CC providers:  Sheri Hopper DO  [address] [address]  [place] [place]

## 2022-03-10 NOTE — PROGRESS NOTES
Giacomo Guzman MD, [facility], [facility]  Nicolasa Ridley [address]  [address] [facility], [address] [phone]   [facility] [address] Gomez Mckeon. [address] [place] [address]. Rosie Llamas [address] [phone]     Video Visit- Consult    Cash Justo, was evaluated through a synchronous (real-time) audio-video  encounter. The patient (or guardian if applicable) is aware that this is a billable  service, which includes applicable co-pays. This Virtual Visit was conducted with  patient's (and/or legal guardian's) consent. The visit was conducted pursuant to  the emergency declaration under the [facility], [address] [place] [address] waVA Hospital authority and the [employer] and  [employer] General Act. Patient identification was verified,  and a caregiver was present when appropriate. The patient was located in a  state where the provider was licensed to provide care. Assessment:      Visit Diagnoses and Associated Orders     Hypersomnia   (New Problem)  -  Primary    needs work-up    Home Sleep Study (HST) [39751 Custom]   - Future Order         Snoring   (New Problem)      needs work-up    Home Sleep Study (HST) [13806 Custom]   - Future Order         Primary hypertension   (Stable)           Non morbid obesity, unspecified obesity type   (Not Stable)                  Plan:      One or more undiagnosed new problem with uncertain prognosis till final diagnosis is made. Differential diagnosis includes but not limited to: MARIBELL, PLMD's, narcolepsy, parasomnias. Reviewed MARIBELL (highest likelihood Dx): pathophysiology, diagnosis, complications and treatment. Instructed him not to drive if drowsy. Continue medications per her PCP and other physicians. Limit caffeine use after 3pm. Standard of care is to do in-lab PSG but insurance is mandating an inferior HST. 1 wk follow up after study to review his results.   The chronic medical conditions listed are directly related to the primary diagnosis listed above. The management of the primary diagnosis affects the secondary diagnosis and vice versa. This information was analyzed to assess complexity and medical decision making in regards to further testing and management. Continue meds for: HTN. Pt would medically benefit from wt loss for MARIBELL (diet, exercise, surgical). Orders Placed This Encounter   Procedures    Home Sleep Study (HST)          Subjective:     Patient ID: Priscila Navarro is a 49 y.o. male. Chief Complaint   Patient presents with    Sleep Apnea    Daytime Sleepiness       HPI:      Priscila Navarro is a 49 y.o. male referred by Huey Simmons DO for a sleep evaluation. He complains of: snoring, excessive daytime sleepiness , non-restorative sleep, nocturia and tossing and turning at night. He denies: cataplexy and hypnagogic hallucinations. Symptoms began several years ago, gradually worsening since that time    Previous evaluation and treatment has included- None     Chronic stable medical conditions: HTN  Chronic unstable medical conditions: obesity    DOT/CDL - No  FAA/'s license -No    Previous Report(s) Reviewed: historical medical records, office notes, and referral letter(s). Pertinent data has been documented. [facility] - Total score: 5    Caffeine Intake - None.     Social History     Socioeconomic History    Marital status:      Spouse name: Not on file    Number of children: Not on file    Years of education: Not on file    Highest education level: Not on file   Occupational History    Not on file   Tobacco Use    Smoking status: Never Smoker    Smokeless tobacco: Never Used   Vaping Use    Vaping Use: Never used   Substance and Sexual Activity    Alcohol use: Yes     Comment: 3 drinks per week at the most    Drug use: Never    Sexual activity: Not on file   Other Topics Concern    Not on file   Social History Narrative    Not on file     Social Determinants of Health     Financial Resource Strain: Low Risk     Difficulty of Paying Living Expenses: Not hard at all   Food Insecurity: No Food Insecurity    Worried About Running Out of Food in the Last Year: Never true    Apolinar of Food in the Last Year: Never true   Transportation Needs:     Lack of Transportation (Medical): Not on file    Lack of Transportation (Non-Medical):  Not on file   Physical Activity:     Days of Exercise per Week: Not on file    Minutes of Exercise per Session: Not on file   Stress:     Feeling of Stress : Not on file   Social Connections:     Frequency of Communication with Friends and Family: Not on file    Frequency of Social Gatherings with Friends and Family: Not on file    Attends Holiness Services: Not on file    Active Member of [address] [place] [address] [employer] or Organizations: Not on file    Attends Club or Organization Meetings: Not on file    Marital Status: Not on file   Intimate Partner Violence:     Fear of Current or Ex-Partner: Not on file    Emotionally Abused: Not on file    Physically Abused: Not on file    Sexually Abused: Not on file   Housing Stability:     Unable to Pay for Housing in the Last Year: Not on file    Number of [place] in the Last Year: Not on file    Unstable Housing in the Last Year: Not on file        Current Outpatient Medications   Medication Instructions    Glucosamine-Chondroit-Vit C-Mn (GLUCOSAMINE 1500 COMPLEX PO) Oral    nebivolol (BYSTOLIC) 2.5 mg, Oral, DAILY    omeprazole (PRILOSEC) 40 mg, Oral, DAILY BEFORE BREAKFAST       Allergies as of 03/10/2022    (No Known Allergies)       Patient Active Problem List   Diagnosis    History of colon polyps    Palpitations    Primary hypertension    Chest pressure    Suspected sleep apnea    Hyperglycemia    Soft tissue swelling of chest wall    Non morbid obesity, unspecified obesity type       Past Medical History:   Diagnosis Date    Non morbid obesity, unspecified obesity type 3/10/2022    Primary hypertension 1/7/2022 Past Surgical History:   Procedure Laterality Date    WISDOM TOOTH EXTRACTION         Family History   Problem Relation Age of Onset    High Blood Pressure Mother     Cancer Mother 76        breast cancer    Emphysema Father     Asthma Father     High Blood Pressure Sister     High Blood Pressure Sister     High Blood Pressure Brother     No Known Problems Son     No Known Problems Son        Review of Systems   Constitutional: Positive for fatigue. Negative for activity change and appetite change. HENT: Positive for congestion. Negative for nosebleeds, postnasal drip, rhinorrhea and sneezing. Eyes: Negative for photophobia, pain and visual disturbance. Respiratory: Negative for apnea, choking, chest tightness and shortness of breath. Cardiovascular: Negative. Gastrointestinal: Negative for abdominal distention, abdominal pain, nausea and vomiting. Endocrine: Negative for cold intolerance and heat intolerance. Genitourinary: Positive for frequency. Negative for difficulty urinating, dysuria and urgency. Musculoskeletal: Negative. Negative for neck pain and neck stiffness. Skin: Negative. Allergic/Immunologic: Negative. Neurological: Negative for tremors, seizures, syncope and weakness. Hematological: Negative for adenopathy. Does not bruise/bleed easily. Psychiatric/Behavioral: Positive for sleep disturbance. Negative for agitation, behavioral problems and confusion. Objective:     Vitals:  Patient reported Height and Weight Calculated BMI   Patient-Reported Vitals 3/10/2022   Patient-Reported Weight 247 lb   Patient-Reported Height 6.1'   Patient-Reported Systolic 309   Patient-Reported Diastolic 85   Patient-Reported Pulse 65       32.6     Due to COVID-19 this was a virtual visit and physical exam was deferred.     Electronically signed by Srikanth Toure MD on3/10/2022 at 4:47 PM

## 2022-03-14 ENCOUNTER — TELEPHONE (OUTPATIENT)
Dept: SLEEP CENTER | Age: 57
End: 2022-03-14

## 2022-03-14 NOTE — TELEPHONE ENCOUNTER
Called to schedule an hst per Chaim Edgar vm for the pt to return my call     [facility] insurance

## 2022-04-12 ENCOUNTER — HOSPITAL ENCOUNTER (OUTPATIENT)
Dept: SLEEP CENTER | Age: 57
Discharge: HOME | End: 2022-04-12
Payer: COMMERCIAL

## 2022-04-12 DIAGNOSIS — R06.83 SNORING: ICD-10-CM

## 2022-04-12 DIAGNOSIS — G47.10 HYPERSOMNIA: ICD-10-CM

## 2022-04-12 PROCEDURE — 95806 SLEEP STUDY UNATT&RESP EFFT: CPT

## 2022-04-14 PROCEDURE — 95806 SLEEP STUDY UNATT&RESP EFFT: CPT | Performed by: INTERNAL MEDICINE

## 2022-04-22 ENCOUNTER — TELEPHONE (OUTPATIENT)
Dept: PULMONOLOGY | Age: 57
End: 2022-04-22

## 2022-04-22 NOTE — TELEPHONE ENCOUNTER
Spoke with pt to review HST. Pt prefers to speak with his PCP before making a decision to use pap therapy.  Copy of HST mailed to pt per his request.

## 2022-08-09 ENCOUNTER — OFFICE VISIT (OUTPATIENT)
Dept: INTERNAL MEDICINE CLINIC | Age: 57
End: 2022-08-09
Payer: COMMERCIAL

## 2022-08-09 VITALS
WEIGHT: 249 LBS | HEART RATE: 57 BPM | OXYGEN SATURATION: 97 % | BODY MASS INDEX: 32.85 KG/M2 | DIASTOLIC BLOOD PRESSURE: 78 MMHG | SYSTOLIC BLOOD PRESSURE: 116 MMHG

## 2022-08-09 DIAGNOSIS — I10 PRIMARY HYPERTENSION: ICD-10-CM

## 2022-08-09 DIAGNOSIS — Z11.4 SCREENING FOR HIV WITHOUT PRESENCE OF RISK FACTORS: ICD-10-CM

## 2022-08-09 DIAGNOSIS — Z86.010 HISTORY OF COLON POLYPS: ICD-10-CM

## 2022-08-09 DIAGNOSIS — Z11.59 ENCOUNTER FOR HEPATITIS C SCREENING TEST FOR LOW RISK PATIENT: ICD-10-CM

## 2022-08-09 DIAGNOSIS — Z79.899 MEDICATION MANAGEMENT: ICD-10-CM

## 2022-08-09 DIAGNOSIS — Z12.5 PROSTATE CANCER SCREENING: ICD-10-CM

## 2022-08-09 DIAGNOSIS — Z00.00 ENCOUNTER FOR WELL ADULT EXAM WITHOUT ABNORMAL FINDINGS: ICD-10-CM

## 2022-08-09 DIAGNOSIS — Z00.00 WELL ADULT EXAM: Primary | ICD-10-CM

## 2022-08-09 DIAGNOSIS — K21.9 GASTROESOPHAGEAL REFLUX DISEASE, UNSPECIFIED WHETHER ESOPHAGITIS PRESENT: ICD-10-CM

## 2022-08-09 DIAGNOSIS — R73.9 HYPERGLYCEMIA: ICD-10-CM

## 2022-08-09 PROCEDURE — 99396 PREV VISIT EST AGE 40-64: CPT | Performed by: INTERNAL MEDICINE

## 2022-08-09 RX ORDER — NEBIVOLOL 2.5 MG/1
2.5 TABLET ORAL DAILY
Qty: 90 TABLET | Refills: 3 | Status: SHIPPED | OUTPATIENT
Start: 2022-08-09

## 2022-08-09 RX ORDER — OMEPRAZOLE 40 MG/1
40 CAPSULE, DELAYED RELEASE ORAL
Qty: 90 CAPSULE | Refills: 2 | Status: SHIPPED | OUTPATIENT
Start: 2022-08-09

## 2022-08-09 NOTE — PROGRESS NOTES
Well Adult Note  Name: Ignacio Uribe Date: 2022   MRN: [id number] Sex: Male   Age: 56 y.o. Ethnicity: Non- / Non    : 1965 Race: White (non-)      Lee Oconnor is here for well adult exam.  History:      Here today for yearly wellness visit. Did have home sleep study and was found to have possibly MARIBELL, recommended trial of CPAP but did not want to try it. Instead, he has been using an oral appliance which has decreased awakenings and he is less tired. Has gained 10 pounds since his last appointment. Is active at work but not exercising d/t increased work load. Not watching his diet. Had labs in 2021, normal.    BP has been running 120's/70's. Doing well on Bystolic. No longer having palpitations. Is still having GERD sx- better with omeprazole but not resolved. Does see dentist regularly. Maybe 2-3 beers per week. Does not smoke (never). Results for orders placed or performed in visit on 22   POCT glycosylated hemoglobin (Hb A1C)   Result Value Ref Range    Hemoglobin A1C 5.1 %          Wt Readings from Last 3 Encounters:   22 249 lb (112.9 kg)   22 239 lb 3.2 oz (108.5 kg)   22 243 lb (110.2 kg)     Temp Readings from Last 3 Encounters:   22 97.6 °F (36.4 °C)     BP Readings from Last 3 Encounters:   22 116/78   22 130/84   22 138/80     Pulse Readings from Last 3 Encounters:   22 57   22 64   22 76           Review of Systems   Constitutional:  Negative for appetite change, fatigue and fever. Respiratory:  Negative for chest tightness and shortness of breath. Cardiovascular:  Negative for chest pain. Gastrointestinal:  Negative for constipation and diarrhea. Skin:  Negative for rash. No Known Allergies      Prior to Visit Medications    Medication Sig Taking? Authorizing Provider   nebivolol (BYSTOLIC) 2.5 MG tablet Take 1 tablet by mouth in the morning.  Yes Gilles Humphrey, DO   omeprazole (PRILOSEC) 40 MG delayed release capsule Take 1 capsule by mouth every morning (before breakfast) Yes Gilles Humphrey, DO   Glucosamine-Chondroit-Vit C-Mn (GLUCOSAMINE 1500 COMPLEX PO) Take by mouth Yes Historical Provider, MD         Past Medical History:   Diagnosis Date    Non morbid obesity, unspecified obesity type 3/10/2022    Primary hypertension 1/7/2022       Past Surgical History:   Procedure Laterality Date    WISDOM TOOTH EXTRACTION           Family History   Problem Relation Age of Onset    High Blood Pressure Mother     Cancer Mother 76        breast cancer    Emphysema Father     Asthma Father     High Blood Pressure Sister     High Blood Pressure Sister     High Blood Pressure Brother     No Known Problems Son     No Known Problems Son        Social History     Tobacco Use    Smoking status: Never    Smokeless tobacco: Never   Vaping Use    Vaping Use: Never used   Substance Use Topics    Alcohol use: Yes     Comment: 3 drinks per week at the most    Drug use: Never       Objective   /78   Pulse 57   Wt 249 lb (112.9 kg)   SpO2 97%   BMI 32.85 kg/m²   Wt Readings from Last 3 Encounters:   08/09/22 249 lb (112.9 kg)   02/09/22 239 lb 3.2 oz (108.5 kg)   01/07/22 243 lb (110.2 kg)     There were no vitals filed for this visit. Physical Exam  Vitals and nursing note reviewed. Constitutional:       Appearance: He is well-developed. He is not toxic-appearing. HENT:      Head: Normocephalic. Right Ear: Tympanic membrane, ear canal and external ear normal.      Left Ear: Tympanic membrane, ear canal and external ear normal.      Mouth/Throat:      Pharynx: No oropharyngeal exudate or posterior oropharyngeal erythema. Eyes:      General: No scleral icterus. Extraocular Movements: Extraocular movements intact. Conjunctiva/sclera: Conjunctivae normal.      Pupils: Pupils are equal, round, and reactive to light.    Neck:      Thyroid: No thyroid mass or thyromegaly. Vascular: No carotid bruit. Cardiovascular:      Rate and Rhythm: Normal rate and regular rhythm. Heart sounds: Normal heart sounds. No murmur heard. Pulmonary:      Effort: Pulmonary effort is normal.      Breath sounds: Normal breath sounds. Abdominal:      Palpations: Abdomen is soft. Tenderness: There is no abdominal tenderness. Musculoskeletal:      Right lower leg: No edema. Left lower leg: No edema. Lymphadenopathy:      Cervical: No cervical adenopathy. Neurological:      General: No focal deficit present. Mental Status: He is alert and oriented to person, place, and time. Psychiatric:         Mood and Affect: Mood normal.         Behavior: Behavior normal. Behavior is cooperative. Assessment   Plan   1. Well adult exam  Assessment & Plan:  No problems identified on exam.  Due for colonoscopy. Referral placed to GI. Up-to-date on tetanus which was given to patient at occupational health. Patient will try to get a copy of that information so we may update his records. Recommend Shingrix. Lab work in January 2022 is excellent so no need to repeat it at this time. Orders:  -     CBC with Auto Differential; Future  -     Comprehensive Metabolic Panel; Future  -     Lipid Panel; Future  -     PSA Screening; Future  -     HIV Screen; Future  -     Hepatitis C Antibody; Future  -     TSH with Reflex; Future  2. History of colon polyps  Assessment & Plan:  Due for repeat colonoscopy. Referral placed. Orders:  -     Huan Bowens DO, Gastroenterology, [facility]  3. Gastroesophageal reflux disease, unspecified whether esophagitis present  Assessment & Plan:   Continue omeprazole 40 mg daily but he is still having symptoms. Referral placed to GI for colonoscopy and EGD. Orders:  -     Huan Bowens DO, Gastroenterology, [facility]  -     Magnesium; Future  -     Vitamin B12 & Folate; Future  4. Screening for HIV without presence of risk factors  -     TSH with Reflex; Future  5. Encounter for hepatitis C screening test for low risk patient  -     Hepatitis C Antibody; Future  6. Prostate cancer screening  -     PSA Screening; Future  7. Primary hypertension  Assessment & Plan:   Very well controlled. Continue Bystolic 2.5 mg nightly. 8. Hyperglycemia  Assessment & Plan:  Most recent hemoglobin A1c was 5.1%. Continue to work on diet. Orders:  -     Hemoglobin A1C; Future  9. Medication management  -     Magnesium; Future  -     Vitamin B12 & Folate; Future  10.  Encounter for well adult exam without abnormal findings       Personalized Preventive Plan   Current Health Maintenance Status  Immunization History   Administered Date(s) Administered    COVID-19, PFIZER PURPLE top, DILUTE for use, (age 15 y+), 30mcg/0.3mL 03/20/2021, 03/20/2021, 04/10/2021, 04/10/2021, 11/26/2021    Hepatitis A 12/09/2018, 06/21/2019    Hepatitis A Adult (Havrix, Vaqta) 12/09/2018, 06/21/2019    Hepatitis B 12/09/2018, 01/13/2019, 06/21/2019    Influenza Virus Vaccine 10/22/2019    Influenza, Rojean Speer, IM, PF (6 mo and older Fluzone, Flulaval, Fluarix, and 3 yrs and older Afluria) 11/01/2018, 10/26/2019, 10/02/2020, 10/18/2021    Td, unspecified formulation 03/26/2004    Tdap (Boostrix, Adacel) 11/20/2012, 07/05/2020        Health Maintenance   Topic Date Due    HIV screen  Never done    Hepatitis C screen  Never done    Colorectal Cancer Screen  Never done    Shingles vaccine (1 of 2) Never done    COVID-19 Vaccine (4 - Booster for Pfizer series) 03/26/2022    Flu vaccine (1) 09/01/2022    Depression Screen  01/07/2023    Diabetes screen  01/07/2025    Lipids  12/22/2026    DTaP/Tdap/Td vaccine (3 - Td or Tdap) 07/05/2030    Hepatitis A vaccine  Aged Out    Hepatitis B vaccine  Aged Out    Hib vaccine  Aged Out    Meningococcal (ACWY) vaccine  Aged Out    Pneumococcal 0-64 years Vaccine  Aged Out     Recommendations for Preventive Services Due: see orders and patient instructions/AVS.    Return in about 1 year (around 8/9/2023).

## 2022-08-14 PROBLEM — K21.9 GASTROESOPHAGEAL REFLUX DISEASE: Status: ACTIVE | Noted: 2022-08-14

## 2022-08-14 ASSESSMENT — ENCOUNTER SYMPTOMS
CONSTIPATION: 0
SHORTNESS OF BREATH: 0
CHEST TIGHTNESS: 0
DIARRHEA: 0

## 2022-08-15 NOTE — ASSESSMENT & PLAN NOTE
Most recent hemoglobin A1c was 5.1%. Continue to work on diet. Patient should be discharging from Hospital today 6/29.    Message to [facility] PSR's:  Type of appointment:  Follow Up Visit  Length of appointment:  30 minutes  Provider:  Kishore Onofre NP  Location of appointment:  [place]  Date/Time of appointment:  to be seen in the next 1-2 weeks   Testing needed for appointment:  No testing prior  Time Frame for Testing:  N/A    Appt Notes: 1-2 week [facility]osp [facility]/No testing prior

## 2022-08-15 NOTE — ASSESSMENT & PLAN NOTE
Continue omeprazole 40 mg daily but he is still having symptoms. Referral placed to GI for colonoscopy and EGD.

## 2022-09-13 PROBLEM — Z00.00 WELL ADULT EXAM: Status: RESOLVED | Noted: 2021-07-31 | Resolved: 2022-09-13

## 2023-02-20 RX ORDER — NEBIVOLOL 2.5 MG/1
TABLET ORAL
Qty: 90 TABLET | Refills: 0 | OUTPATIENT
Start: 2023-02-20

## 2023-02-23 RX ORDER — NEBIVOLOL 2.5 MG/1
2.5 TABLET ORAL DAILY
Qty: 90 TABLET | Refills: 3 | OUTPATIENT
Start: 2023-02-23

## 2023-03-03 RX ORDER — NEBIVOLOL 2.5 MG/1
TABLET ORAL
Qty: 90 TABLET | Refills: 0 | OUTPATIENT
Start: 2023-03-03

## 2023-03-03 RX ORDER — NEBIVOLOL 2.5 MG/1
2.5 TABLET ORAL DAILY
Qty: 90 TABLET | Refills: 3 | OUTPATIENT
Start: 2023-03-03

## 2023-03-03 NOTE — TELEPHONE ENCOUNTER
Pt calling having trouble getting his Nebivolol---and he is out---the pharmacy is telling  him they have been using the script from last March ---there was one sent in August but now they won't use that one saying  because they kept using the one from last March---can you please help fix this? Please let pt know when problem fixed. Thanks.

## 2023-12-28 ENCOUNTER — TELEPHONE (OUTPATIENT)
Dept: INTERNAL MEDICINE CLINIC | Age: 58
End: 2023-12-28

## 2023-12-28 RX ORDER — NEBIVOLOL 2.5 MG/1
2.5 TABLET ORAL DAILY
Qty: 90 TABLET | Refills: 1 | Status: SHIPPED | OUTPATIENT
Start: 2023-12-28

## 2023-12-28 NOTE — TELEPHONE ENCOUNTER
----- Message from [facility] sent at 12/26/2023 10:43 AM EST -----  Subject: Refill Request    QUESTIONS  Name of Medication? nebivolol (BYSTOLIC) 2.5 MG tablet  Patient-reported dosage and instructions? once a day - 2.5 mg  How many days do you have left? 14  Preferred Pharmacy? [facility]   Pharmacy phone number (if available)? [phone]  ---------------------------------------------------------------------------  --------------  Keisha FUNK  What is the best way for the office to contact you? OK to leave message on   voicemail  Preferred Call Back Phone Number? [phone]  ---------------------------------------------------------------------------  --------------  SCRIPT ANSWERS  Relationship to Patient?  Self

## 2024-03-11 ENCOUNTER — TELEPHONE (OUTPATIENT)
Dept: INTERNAL MEDICINE CLINIC | Age: 59
End: 2024-03-11

## 2024-03-11 DIAGNOSIS — Z12.5 PROSTATE CANCER SCREENING: ICD-10-CM

## 2024-03-11 DIAGNOSIS — I10 PRIMARY HYPERTENSION: Chronic | ICD-10-CM

## 2024-03-11 DIAGNOSIS — Z79.899 MEDICATION MANAGEMENT: ICD-10-CM

## 2024-03-11 DIAGNOSIS — Z00.00 WELL ADULT EXAM: Primary | ICD-10-CM

## 2024-03-11 NOTE — TELEPHONE ENCOUNTER
----- Message from Cristiane Miriam sent at 3/11/2024  8:56 AM EDT -----  Subject: Referral Request    Reason for referral request? Patient is needing to get lab orders in for   appointment coming up next week please call patient when those orders are   placed so knows can get them done.  Provider patient wants to be referred to(if known):     Provider Phone Number(if known):    Additional Information for Provider?   ---------------------------------------------------------------------------  --------------  CALL BACK INFO    [phone]; OK to leave message on voicemail  ---------------------------------------------------------------------------  --------------

## 2024-03-13 DIAGNOSIS — Z79.899 MEDICATION MANAGEMENT: ICD-10-CM

## 2024-03-13 DIAGNOSIS — Z12.5 PROSTATE CANCER SCREENING: ICD-10-CM

## 2024-03-13 DIAGNOSIS — Z00.00 WELL ADULT EXAM: ICD-10-CM

## 2024-03-13 LAB
ALBUMIN SERPL-MCNC: 4.7 G/DL (ref 3.4–5)
ALBUMIN/GLOB SERPL: 2 {RATIO} (ref 1.1–2.2)
ALP SERPL-CCNC: 65 U/L (ref 40–129)
ALT SERPL-CCNC: 34 U/L (ref 10–40)
ANION GAP SERPL CALCULATED.3IONS-SCNC: 11 MMOL/L (ref 3–16)
AST SERPL-CCNC: 28 U/L (ref 15–37)
BASOPHILS # BLD: 0 K/UL (ref 0–0.2)
BASOPHILS NFR BLD: 0.5 %
BILIRUB SERPL-MCNC: 1.4 MG/DL (ref 0–1)
BUN SERPL-MCNC: 13 MG/DL (ref 7–20)
CALCIUM SERPL-MCNC: 9.7 MG/DL (ref 8.3–10.6)
CHLORIDE SERPL-SCNC: 104 MMOL/L (ref 99–110)
CHOLEST SERPL-MCNC: 183 MG/DL (ref 0–199)
CO2 SERPL-SCNC: 28 MMOL/L (ref 21–32)
CREAT SERPL-MCNC: 0.9 MG/DL (ref 0.9–1.3)
DEPRECATED RDW RBC AUTO: 13.9 % (ref 12.4–15.4)
EOSINOPHIL # BLD: 0.1 K/UL (ref 0–0.6)
EOSINOPHIL NFR BLD: 1.3 %
FOLATE SERPL-MCNC: >20 NG/ML (ref 4.78–24.2)
GFR SERPLBLD CREATININE-BSD FMLA CKD-EPI: >60 ML/MIN/{1.73_M2}
GLUCOSE SERPL-MCNC: 92 MG/DL (ref 70–99)
HCT VFR BLD AUTO: 45.9 % (ref 40.5–52.5)
HDLC SERPL-MCNC: 52 MG/DL (ref 40–60)
HGB BLD-MCNC: 15.6 G/DL (ref 13.5–17.5)
LDLC SERPL CALC-MCNC: 118 MG/DL
LYMPHOCYTES # BLD: 2.2 K/UL (ref 1–5.1)
LYMPHOCYTES NFR BLD: 45.2 %
MAGNESIUM SERPL-MCNC: 2.4 MG/DL (ref 1.8–2.4)
MCH RBC QN AUTO: 31 PG (ref 26–34)
MCHC RBC AUTO-ENTMCNC: 34 G/DL (ref 31–36)
MCV RBC AUTO: 91.1 FL (ref 80–100)
MONOCYTES # BLD: 0.4 K/UL (ref 0–1.3)
MONOCYTES NFR BLD: 8.7 %
NEUTROPHILS # BLD: 2.1 K/UL (ref 1.7–7.7)
NEUTROPHILS NFR BLD: 44.3 %
PLATELET # BLD AUTO: 263 K/UL (ref 135–450)
PMV BLD AUTO: 7.7 FL (ref 5–10.5)
POTASSIUM SERPL-SCNC: 3.9 MMOL/L (ref 3.5–5.1)
PROT SERPL-MCNC: 7 G/DL (ref 6.4–8.2)
PSA SERPL DL<=0.01 NG/ML-MCNC: 0.69 NG/ML (ref 0–4)
RBC # BLD AUTO: 5.04 M/UL (ref 4.2–5.9)
SODIUM SERPL-SCNC: 143 MMOL/L (ref 136–145)
TRIGL SERPL-MCNC: 63 MG/DL (ref 0–150)
VIT B12 SERPL-MCNC: 389 PG/ML (ref 211–911)
VLDLC SERPL CALC-MCNC: 13 MG/DL
WBC # BLD AUTO: 4.8 K/UL (ref 4–11)

## 2024-03-18 ENCOUNTER — TELEPHONE (OUTPATIENT)
Dept: INTERNAL MEDICINE CLINIC | Age: 59
End: 2024-03-18

## 2024-03-20 ASSESSMENT — PATIENT HEALTH QUESTIONNAIRE - PHQ9
SUM OF ALL RESPONSES TO PHQ QUESTIONS 1-9: 0
SUM OF ALL RESPONSES TO PHQ QUESTIONS 1-9: 0
SUM OF ALL RESPONSES TO PHQ9 QUESTIONS 1 & 2: 0
SUM OF ALL RESPONSES TO PHQ9 QUESTIONS 1 & 2: 0
SUM OF ALL RESPONSES TO PHQ QUESTIONS 1-9: 0
2. FEELING DOWN, DEPRESSED OR HOPELESS: NOT AT ALL
SUM OF ALL RESPONSES TO PHQ QUESTIONS 1-9: 0
1. LITTLE INTEREST OR PLEASURE IN DOING THINGS: NOT AT ALL
1. LITTLE INTEREST OR PLEASURE IN DOING THINGS: NOT AT ALL
2. FEELING DOWN, DEPRESSED OR HOPELESS: NOT AT ALL

## 2024-03-21 ENCOUNTER — OFFICE VISIT (OUTPATIENT)
Dept: INTERNAL MEDICINE CLINIC | Age: 59
End: 2024-03-21
Payer: COMMERCIAL

## 2024-03-21 VITALS
OXYGEN SATURATION: 97 % | HEART RATE: 63 BPM | DIASTOLIC BLOOD PRESSURE: 84 MMHG | BODY MASS INDEX: 32.32 KG/M2 | WEIGHT: 245 LBS | SYSTOLIC BLOOD PRESSURE: 116 MMHG

## 2024-03-21 DIAGNOSIS — I10 PRIMARY HYPERTENSION: Chronic | ICD-10-CM

## 2024-03-21 DIAGNOSIS — Z12.5 PROSTATE CANCER SCREENING: ICD-10-CM

## 2024-03-21 DIAGNOSIS — R17 SERUM TOTAL BILIRUBIN ELEVATED: ICD-10-CM

## 2024-03-21 DIAGNOSIS — Z86.010 HISTORY OF COLON POLYPS: ICD-10-CM

## 2024-03-21 DIAGNOSIS — E66.9 NON MORBID OBESITY, UNSPECIFIED OBESITY TYPE: Chronic | ICD-10-CM

## 2024-03-21 DIAGNOSIS — Z00.00 ENCOUNTER FOR WELL ADULT EXAM WITHOUT ABNORMAL FINDINGS: ICD-10-CM

## 2024-03-21 DIAGNOSIS — K21.9 GASTROESOPHAGEAL REFLUX DISEASE, UNSPECIFIED WHETHER ESOPHAGITIS PRESENT: ICD-10-CM

## 2024-03-21 DIAGNOSIS — Z00.00 WELL ADULT EXAM: Primary | ICD-10-CM

## 2024-03-21 PROBLEM — R29.818 SUSPECTED SLEEP APNEA: Status: RESOLVED | Noted: 2022-01-07 | Resolved: 2024-03-21

## 2024-03-21 PROBLEM — R22.2 SOFT TISSUE SWELLING OF CHEST WALL: Status: RESOLVED | Noted: 2022-01-08 | Resolved: 2024-03-21

## 2024-03-21 PROBLEM — R00.2 PALPITATIONS: Status: RESOLVED | Noted: 2022-01-07 | Resolved: 2024-03-21

## 2024-03-21 PROBLEM — R07.89 CHEST PRESSURE: Status: RESOLVED | Noted: 2022-01-07 | Resolved: 2024-03-21

## 2024-03-21 LAB
ALBUMIN SERPL-MCNC: 4.7 G/DL (ref 3.4–5)
ALP SERPL-CCNC: 67 U/L (ref 40–129)
ALT SERPL-CCNC: 28 U/L (ref 10–40)
AST SERPL-CCNC: 24 U/L (ref 15–37)
BILIRUB DIRECT SERPL-MCNC: <0.2 MG/DL (ref 0–0.3)
BILIRUB INDIRECT SERPL-MCNC: NORMAL MG/DL (ref 0–1)
BILIRUB SERPL-MCNC: 0.8 MG/DL (ref 0–1)
PROT SERPL-MCNC: 7 G/DL (ref 6.4–8.2)

## 2024-03-21 PROCEDURE — 3074F SYST BP LT 130 MM HG: CPT | Performed by: INTERNAL MEDICINE

## 2024-03-21 PROCEDURE — 99396 PREV VISIT EST AGE 40-64: CPT | Performed by: INTERNAL MEDICINE

## 2024-03-21 PROCEDURE — 3079F DIAST BP 80-89 MM HG: CPT | Performed by: INTERNAL MEDICINE

## 2024-03-21 RX ORDER — NEBIVOLOL 2.5 MG/1
2.5 TABLET ORAL DAILY
Qty: 90 TABLET | Refills: 3 | Status: SHIPPED | OUTPATIENT
Start: 2024-03-21

## 2024-03-21 RX ORDER — M-VIT,TX,IRON,MINS/CALC/FOLIC 27MG-0.4MG
1 TABLET ORAL DAILY
COMMUNITY

## 2024-03-21 SDOH — ECONOMIC STABILITY: FOOD INSECURITY: WITHIN THE PAST 12 MONTHS, YOU WORRIED THAT YOUR FOOD WOULD RUN OUT BEFORE YOU GOT MONEY TO BUY MORE.: NEVER TRUE

## 2024-03-21 SDOH — ECONOMIC STABILITY: HOUSING INSECURITY
IN THE LAST 12 MONTHS, WAS THERE A TIME WHEN YOU DID NOT HAVE A STEADY PLACE TO SLEEP OR SLEPT IN A SHELTER (INCLUDING NOW)?: NO

## 2024-03-21 SDOH — ECONOMIC STABILITY: INCOME INSECURITY: HOW HARD IS IT FOR YOU TO PAY FOR THE VERY BASICS LIKE FOOD, HOUSING, MEDICAL CARE, AND HEATING?: NOT VERY HARD

## 2024-03-21 SDOH — ECONOMIC STABILITY: FOOD INSECURITY: WITHIN THE PAST 12 MONTHS, THE FOOD YOU BOUGHT JUST DIDN'T LAST AND YOU DIDN'T HAVE MONEY TO GET MORE.: NEVER TRUE

## 2024-03-21 ASSESSMENT — ENCOUNTER SYMPTOMS
DIARRHEA: 0
CHEST TIGHTNESS: 0
SHORTNESS OF BREATH: 0
CONSTIPATION: 0

## 2024-03-21 NOTE — PATIENT INSTRUCTIONS
Please add over the counter vitamin B12 1000 mcg daily.     Pepcid Complete as needed for heartburn.   Advance Care Planning     Advance Care Planning opens a door to talk about and write down your wishes before a sudden accident or illness.  Make your goals, values, and preferences known.     This puts you in the ’s seat and helps others know what matters most to you so they won’t have to guess.      Where can you learn more?    Go to [email or web address]   to learn how to:    Name someone you trust to make healthcare decisions for you, only if you can’t. (Healthcare Power of )    Document your wishes for care if you were seriously ill and not expected to recover or are approaching end of life. (Advance Directive or Living Will)    The same page can be found using the QR code below.

## 2024-03-21 NOTE — ASSESSMENT & PLAN NOTE
No longer taking omeprazole.  Did undergo EGD in February 2023 that was unremarkable.  Recommend over-the-counter Pepcid Complete as needed.

## 2024-03-21 NOTE — ASSESSMENT & PLAN NOTE
No problems identified on exam.  Up-to-date on colonoscopy-last done in February 2023 with repeat due in 2025.  Up-to-date on tetanus which was given to patient at occupational health.  Lab work reviewed and is without concern other than mildly elevated bilirubin.  LFTs are being repeated today.

## 2024-03-21 NOTE — PROGRESS NOTES
Well Adult Note  Name: Abelardo Subramanian Today’s Date: 3/21/2024   MRN: [id number] Sex: Male   Age: 58 y.o. Ethnicity: Non- / Non    : 1965 Race: White (non-)      Abelardo Subramanian is here for well adult exam.  History:    Here today for wellness visit.     Over all is doing well.   States that he had gained weight over the fall, lost weight again. Noticed that his BP was going up with weight gain.     Diet- has cut out fast food, soda. Trying to eat more fruits and vegetables and making better food choices. Stops eating after 7 pm.     Water- drinks a lot of water.     Alcohol- drinks maybe 4-8 beers/month but not daily or weekly.     Caffeine- 1 (occasionally 2) coffees/day.     Exercise- walking. Has very active job.    Colonoscopy 23- repeat in 5 years.     No tobacco. No vaping.     Pulled a muscle in his back in October. Pain is not constant but notices if he moves a certain way. Will have numbness in lateral thigh if he stands for a longer period of time.     Stopped taking omeprazole as he did not find it to be helpful.  Only has heartburn on occasion.    Had home sleep study in 2022 with mild obstructive sleep apnea.    Results for orders placed or performed in visit on 24   Magnesium   Result Value Ref Range    Magnesium 2.40 1.80 - 2.40 mg/dL   PSA Screening   Result Value Ref Range    PSA 0.69 0.00 - 4.00 ng/mL   Lipid Panel   Result Value Ref Range    Cholesterol, Total 183 0 - 199 mg/dL    Triglycerides 63 0 - 150 mg/dL    HDL 52 40 - 60 mg/dL    LDL Calculated 118 (H) <100 mg/dL    VLDL Cholesterol Calculated 13 Not Established mg/dL   Comprehensive Metabolic Panel   Result Value Ref Range    Sodium 143 136 - 145 mmol/L    Potassium 3.9 3.5 - 5.1 mmol/L    Chloride 104 99 - 110 mmol/L    CO2 28 21 - 32 mmol/L    Anion Gap 11 3 - 16    Glucose 92 70 - 99 mg/dL    BUN 13 7 - 20 mg/dL    Creatinine 0.9 0.9 - 1.3 mg/dL    Est, Glom Filt Rate >60 >60    Calcium 9.7 8.3

## 2024-04-20 PROBLEM — Z00.00 WELL ADULT EXAM: Status: RESOLVED | Noted: 2021-07-31 | Resolved: 2024-04-20

## 2025-03-03 DIAGNOSIS — Z12.5 PROSTATE CANCER SCREENING: ICD-10-CM

## 2025-03-03 DIAGNOSIS — Z00.00 WELL ADULT EXAM: ICD-10-CM

## 2025-03-03 LAB
ALBUMIN SERPL-MCNC: 4.6 G/DL (ref 3.4–5)
ALBUMIN/GLOB SERPL: 1.8 {RATIO} (ref 1.1–2.2)
ALP SERPL-CCNC: 66 U/L (ref 40–129)
ALT SERPL-CCNC: 43 U/L (ref 10–40)
ANION GAP SERPL CALCULATED.3IONS-SCNC: 11 MMOL/L (ref 3–16)
AST SERPL-CCNC: 31 U/L (ref 15–37)
BASOPHILS # BLD: 0 K/UL (ref 0–0.2)
BASOPHILS NFR BLD: 0.6 %
BILIRUB SERPL-MCNC: 1 MG/DL (ref 0–1)
BUN SERPL-MCNC: 11 MG/DL (ref 7–20)
CALCIUM SERPL-MCNC: 9.9 MG/DL (ref 8.3–10.6)
CHLORIDE SERPL-SCNC: 104 MMOL/L (ref 99–110)
CHOLEST SERPL-MCNC: 184 MG/DL (ref 0–199)
CO2 SERPL-SCNC: 28 MMOL/L (ref 21–32)
CREAT SERPL-MCNC: 0.9 MG/DL (ref 0.9–1.3)
DEPRECATED RDW RBC AUTO: 13.4 % (ref 12.4–15.4)
EOSINOPHIL # BLD: 0.1 K/UL (ref 0–0.6)
EOSINOPHIL NFR BLD: 1.1 %
GFR SERPLBLD CREATININE-BSD FMLA CKD-EPI: >90 ML/MIN/{1.73_M2}
GLUCOSE SERPL-MCNC: 95 MG/DL (ref 70–99)
HCT VFR BLD AUTO: 45.7 % (ref 40.5–52.5)
HDLC SERPL-MCNC: 54 MG/DL (ref 40–60)
HGB BLD-MCNC: 15.6 G/DL (ref 13.5–17.5)
LDLC SERPL CALC-MCNC: 114 MG/DL
LYMPHOCYTES # BLD: 2 K/UL (ref 1–5.1)
LYMPHOCYTES NFR BLD: 34.7 %
MCH RBC QN AUTO: 31.6 PG (ref 26–34)
MCHC RBC AUTO-ENTMCNC: 34.2 G/DL (ref 31–36)
MCV RBC AUTO: 92.2 FL (ref 80–100)
MONOCYTES # BLD: 0.4 K/UL (ref 0–1.3)
MONOCYTES NFR BLD: 6.6 %
NEUTROPHILS # BLD: 3.2 K/UL (ref 1.7–7.7)
NEUTROPHILS NFR BLD: 57 %
PLATELET # BLD AUTO: 257 K/UL (ref 135–450)
PMV BLD AUTO: 7.9 FL (ref 5–10.5)
POTASSIUM SERPL-SCNC: 3.8 MMOL/L (ref 3.5–5.1)
PROT SERPL-MCNC: 7.2 G/DL (ref 6.4–8.2)
PSA SERPL DL<=0.01 NG/ML-MCNC: 0.57 NG/ML (ref 0–4)
RBC # BLD AUTO: 4.95 M/UL (ref 4.2–5.9)
SODIUM SERPL-SCNC: 143 MMOL/L (ref 136–145)
TRIGL SERPL-MCNC: 80 MG/DL (ref 0–150)
VLDLC SERPL CALC-MCNC: 16 MG/DL
WBC # BLD AUTO: 5.6 K/UL (ref 4–11)

## 2025-03-18 ASSESSMENT — PATIENT HEALTH QUESTIONNAIRE - PHQ9
SUM OF ALL RESPONSES TO PHQ QUESTIONS 1-9: 0
1. LITTLE INTEREST OR PLEASURE IN DOING THINGS: NOT AT ALL
2. FEELING DOWN, DEPRESSED OR HOPELESS: NOT AT ALL
1. LITTLE INTEREST OR PLEASURE IN DOING THINGS: NOT AT ALL
2. FEELING DOWN, DEPRESSED OR HOPELESS: NOT AT ALL
SUM OF ALL RESPONSES TO PHQ QUESTIONS 1-9: 0
SUM OF ALL RESPONSES TO PHQ9 QUESTIONS 1 & 2: 0

## 2025-03-21 ENCOUNTER — OFFICE VISIT (OUTPATIENT)
Dept: INTERNAL MEDICINE CLINIC | Age: 60
End: 2025-03-21
Payer: COMMERCIAL

## 2025-03-21 VITALS
OXYGEN SATURATION: 98 % | HEIGHT: 73 IN | DIASTOLIC BLOOD PRESSURE: 84 MMHG | BODY MASS INDEX: 33.58 KG/M2 | HEART RATE: 66 BPM | WEIGHT: 253.4 LBS | SYSTOLIC BLOOD PRESSURE: 122 MMHG

## 2025-03-21 DIAGNOSIS — R74.01 ELEVATED ALT MEASUREMENT: ICD-10-CM

## 2025-03-21 DIAGNOSIS — I10 PRIMARY HYPERTENSION: Chronic | ICD-10-CM

## 2025-03-21 DIAGNOSIS — Z12.5 PROSTATE CANCER SCREENING: ICD-10-CM

## 2025-03-21 DIAGNOSIS — Z00.00 WELL ADULT EXAM: Primary | ICD-10-CM

## 2025-03-21 PROCEDURE — 3074F SYST BP LT 130 MM HG: CPT | Performed by: INTERNAL MEDICINE

## 2025-03-21 PROCEDURE — 99396 PREV VISIT EST AGE 40-64: CPT | Performed by: INTERNAL MEDICINE

## 2025-03-21 PROCEDURE — 3079F DIAST BP 80-89 MM HG: CPT | Performed by: INTERNAL MEDICINE

## 2025-03-21 RX ORDER — NEBIVOLOL 2.5 MG/1
2.5 TABLET ORAL DAILY
Qty: 90 TABLET | Refills: 3 | Status: SHIPPED | OUTPATIENT
Start: 2025-03-21

## 2025-03-21 RX ORDER — LANOLIN ALCOHOL/MO/W.PET/CERES
1000 CREAM (GRAM) TOPICAL DAILY
COMMUNITY

## 2025-03-21 SDOH — ECONOMIC STABILITY: FOOD INSECURITY: WITHIN THE PAST 12 MONTHS, THE FOOD YOU BOUGHT JUST DIDN'T LAST AND YOU DIDN'T HAVE MONEY TO GET MORE.: NEVER TRUE

## 2025-03-21 SDOH — ECONOMIC STABILITY: FOOD INSECURITY: WITHIN THE PAST 12 MONTHS, YOU WORRIED THAT YOUR FOOD WOULD RUN OUT BEFORE YOU GOT MONEY TO BUY MORE.: NEVER TRUE

## 2025-03-21 NOTE — PROGRESS NOTES
Well Adult Note  Name: Abelardo Subramanian Today’s Date: 3/21/2025   MRN: [id number] Sex: Male   Age: 59 y.o. Ethnicity: Non- / Non    : 1965 Race: White (non-)      Abelardo Subramanian is here for a well adult exam.       Assessment & Plan   Well adult exam  Assessment & Plan:  No problems identified on exam.  Up-to-date on colonoscopy-last done in 2023 with repeat due in .  Up-to-date on tetanus which was given to patient at occupational health in .  Lab work reviewed and is without concern other than mildly elevated ALT, likely secondary to cold medicine he was taking at time of lab draw.  LFTs are being repeated today.  Discussed new recommendations for pneumonia vaccination starting at 50 as opposed to 65.  Patient prefers to defer until he is 67.  Diet and exercise discussed.  Orders:  -     Lipid Panel; Future  -     Comprehensive Metabolic Panel; Future  -     CBC with Auto Differential; Future  -     PSA Screening; Future  Primary hypertension  Assessment & Plan:   Very well controlled.  Continue Bystolic 2.5 mg nightly.  Elevated ALT measurement  Assessment & Plan:   Potentially secondary to cold medicine.  Repeat LFTs today.  Orders:  -     Hepatic Function Panel; Future  Prostate cancer screening  -     PSA Screening; Future        Return in about 1 year (around 3/21/2026).       Subjective   History:    Here today for yearly wellness visit.   States that he is doing well.    Diet- not as good at watching his diet. Does pack his lunch but ends up eating out. Snacks too much. \"I love eating\".  Son is getting  in October and wants to loose weight before then.     Drinks primarily water but does drink Dr. Pepper daily.     Caffeine- 1 coffee/day and 1 Dr. Pepper/day.    Exercise- has had a job change that is less active. Does walk twice weekly with his wife.     Alcohol- not daily. 1-2 oz of bourbon every 2 weeks. 1 beer every 2 weeks.      No tobacco. No

## 2025-03-22 ENCOUNTER — RESULTS FOLLOW-UP (OUTPATIENT)
Dept: INTERNAL MEDICINE CLINIC | Age: 60
End: 2025-03-22

## 2025-03-22 DIAGNOSIS — R74.01 ELEVATED ALT MEASUREMENT: Primary | ICD-10-CM

## 2025-03-22 LAB
ALBUMIN SERPL-MCNC: 4.5 G/DL (ref 3.4–5)
ALP SERPL-CCNC: 70 U/L (ref 40–129)
ALT SERPL-CCNC: 43 U/L (ref 10–40)
AST SERPL-CCNC: 32 U/L (ref 15–37)
BILIRUB DIRECT SERPL-MCNC: 0.3 MG/DL (ref 0–0.3)
BILIRUB INDIRECT SERPL-MCNC: 0.5 MG/DL (ref 0–1)
BILIRUB SERPL-MCNC: 0.8 MG/DL (ref 0–1)
PROT SERPL-MCNC: 6.9 G/DL (ref 6.4–8.2)

## 2025-03-22 NOTE — ASSESSMENT & PLAN NOTE
No problems identified on exam.  Up-to-date on colonoscopy-last done in February 2023 with repeat due in 2025.  Up-to-date on tetanus which was given to patient at occupational health in 2020.  Lab work reviewed and is without concern other than mildly elevated ALT, likely secondary to cold medicine he was taking at time of lab draw.  LFTs are being repeated today.  Discussed new recommendations for pneumonia vaccination starting at 50 as opposed to 65.  Patient prefers to defer until he is 67.  Diet and exercise discussed.

## 2025-04-07 RX ORDER — NEBIVOLOL 2.5 MG/1
2.5 TABLET ORAL DAILY
Qty: 90 TABLET | Refills: 1 | Status: SHIPPED | OUTPATIENT
Start: 2025-04-07

## 2025-04-07 NOTE — TELEPHONE ENCOUNTER
Last OV: 3/21/2025  Next OV: 3/23/2026    Next appointment due:3/21/2026    Last fill:03/21/2025  Refills:3    OK to fill

## 2025-04-20 PROBLEM — Z00.00 WELL ADULT EXAM: Status: RESOLVED | Noted: 2021-07-31 | Resolved: 2025-04-20
